# Patient Record
Sex: MALE | Race: WHITE | Employment: OTHER | URBAN - METROPOLITAN AREA
[De-identification: names, ages, dates, MRNs, and addresses within clinical notes are randomized per-mention and may not be internally consistent; named-entity substitution may affect disease eponyms.]

---

## 2018-03-27 ENCOUNTER — HOSPITAL ENCOUNTER (OUTPATIENT)
Dept: MRI IMAGING | Age: 73
Discharge: HOME | End: 2018-03-27
Attending: ORTHOPAEDIC SURGERY
Payer: MEDICARE

## 2018-03-27 DIAGNOSIS — M48.062 SPINAL STENOSIS, LUMBAR REGION WITH NEUROGENIC CLAUDICATION: ICD-10-CM

## 2018-03-27 PROCEDURE — 72148 MRI LUMBAR SPINE W/O DYE: CPT

## 2018-07-02 ENCOUNTER — HOSPITAL ENCOUNTER (OUTPATIENT)
Dept: DIABETES SERVICES | Age: 73
Discharge: HOME | End: 2018-07-02
Payer: MEDICARE

## 2018-07-02 DIAGNOSIS — E11.9 TYPE 2 DIABETES MELLITUS WITHOUT COMPLICATION, UNSPECIFIED WHETHER LONG TERM INSULIN USE (HCC): ICD-10-CM

## 2018-07-02 PROCEDURE — G0109 DIAB MANAGE TRN IND/GROUP: HCPCS | Performed by: DIETITIAN, REGISTERED

## 2018-10-05 ENCOUNTER — HOSPITAL ENCOUNTER (OUTPATIENT)
Dept: VASCULAR SURGERY | Age: 73
Discharge: HOME | End: 2018-10-05
Attending: NEUROLOGICAL SURGERY
Payer: MEDICARE

## 2018-10-05 DIAGNOSIS — R09.89: ICD-10-CM

## 2018-10-05 PROCEDURE — 93923 UPR/LXTR ART STDY 3+ LVLS: CPT

## 2018-10-05 NOTE — PROCEDURES
[facility]  *** FINAL REPORT ***    Name: Rob Oconnor  MRN: [id number]    Outpatient  : 01 Sep 1945  HIS Order #: [id number]  [id number] [facility] Visit #: [id number]  Date: 05 Oct 2018    TYPE OF TEST: Peripheral Arterial Testing    REASON FOR TEST  Pulseless    Right Leg  Segmentals: Abnormal                     mmHg  Brachial         128  High thigh       140  Low thigh        136  Calf             128  Posterior tibial  87  Dorsalis pedis    90  Peroneal  Metatarsal  Toe pressure      67  Doppler:  PVR:        Normal  Ankle/Brachial: 0.69    Left Leg  Segmentals: Abnormal                     mmHg  Brachial         131  High thigh       106  Low thigh         92  Calf              87  Posterior tibial  94  Dorsalis pedis    79  Peroneal  Metatarsal  Toe pressure      56  Doppler:  PVR:        Abnormal  Ankle/Brachial: 0.72    INTERPRETATION/FINDINGS  PROCEDURE:  Evaluation of lower extremity arteries with multilevel  systolic blood pressure measurements and pulse volume recording (PVR)  plethysmography. Includes calculation of the ankle/brachial pressure  indices (CORTES's). IMPRESSION:  1. Moderate peripheral arterial disease indicated at rest in the right   leg at multiple levels. 2. Moderate peripheral arterial disease indicated at rest in the left  leg at multiple levels. 3. The right ankle/brachial index is 0.69 and the left ankle/brachial  index is 0.72.  4. The right toe/brachial index is 0.51 and the left toe/brachial  index is 0.43, both indicatiing vascular cisease in the digits. ADDITIONAL COMMENTS    I have personally reviewed the data relevant to the interpretation of  this  study. TECHNOLOGIST: Lalitha Marcum RVT  Signed: 10/05/2018 09:53 AM    PHYSICIAN: Adilene Huff MD  Signed: 10/08/2018 10:26 AM

## 2019-10-17 ENCOUNTER — HOSPITAL ENCOUNTER (OUTPATIENT)
Dept: ULTRASOUND IMAGING | Age: 74
Discharge: HOME | End: 2019-10-17
Attending: INTERNAL MEDICINE
Payer: MEDICARE

## 2019-10-17 DIAGNOSIS — N18.30 CHRONIC KIDNEY DISEASE, STAGE III (MODERATE) (HCC): ICD-10-CM

## 2019-10-17 PROCEDURE — 76770 US EXAM ABDO BACK WALL COMP: CPT

## 2021-12-29 ENCOUNTER — HOSPITAL ENCOUNTER (OUTPATIENT)
Age: 76
Setting detail: OUTPATIENT SURGERY
Discharge: HOME | End: 2021-12-29
Attending: SPECIALIST | Admitting: SPECIALIST
Payer: MEDICARE

## 2021-12-29 VITALS
OXYGEN SATURATION: 100 % | TEMPERATURE: 97.6 F | BODY MASS INDEX: 29.11 KG/M2 | SYSTOLIC BLOOD PRESSURE: 104 MMHG | HEART RATE: 86 BPM | DIASTOLIC BLOOD PRESSURE: 84 MMHG | HEIGHT: 70 IN | RESPIRATION RATE: 24 BRPM | WEIGHT: 203.3 LBS

## 2021-12-29 DIAGNOSIS — I35.2 AORTIC HEART VALVE NARROWING WITH INSUFFICIENCY: ICD-10-CM

## 2021-12-29 LAB
ACT BLD: 172 SECS (ref 79–138)
ANION GAP SERPL CALC-SCNC: 8 MMOL/L (ref 5–15)
ATRIAL RATE: 78 BPM
BUN SERPL-MCNC: 23 MG/DL (ref 6–20)
BUN/CREAT SERPL: 18 (ref 12–20)
CALCIUM SERPL-MCNC: 9.2 MG/DL (ref 8.5–10.1)
CALCULATED P AXIS, ECG09: 60 DEGREES
CALCULATED R AXIS, ECG10: 4 DEGREES
CALCULATED T AXIS, ECG11: 45 DEGREES
CHLORIDE SERPL-SCNC: 109 MMOL/L (ref 97–108)
CO2 SERPL-SCNC: 24 MMOL/L (ref 21–32)
CREAT SERPL-MCNC: 1.31 MG/DL (ref 0.7–1.3)
DIAGNOSIS, 93000: NORMAL
ERYTHROCYTE [DISTWIDTH] IN BLOOD BY AUTOMATED COUNT: 14.6 % (ref 11.5–14.5)
GLUCOSE SERPL-MCNC: 120 MG/DL (ref 65–100)
HCT VFR BLD AUTO: 50.1 % (ref 36.6–50.3)
HGB BLD-MCNC: 16.2 G/DL (ref 12.1–17)
MAGNESIUM SERPL-MCNC: 2.6 MG/DL (ref 1.6–2.4)
MCH RBC QN AUTO: 28.7 PG (ref 26–34)
MCHC RBC AUTO-ENTMCNC: 32.3 G/DL (ref 30–36.5)
MCV RBC AUTO: 88.7 FL (ref 80–99)
NRBC # BLD: 0 K/UL (ref 0–0.01)
NRBC BLD-RTO: 0 PER 100 WBC
P-R INTERVAL, ECG05: 140 MS
PLATELET # BLD AUTO: 209 K/UL (ref 150–400)
PMV BLD AUTO: 10.4 FL (ref 8.9–12.9)
POTASSIUM SERPL-SCNC: 4.2 MMOL/L (ref 3.5–5.1)
Q-T INTERVAL, ECG07: 402 MS
QRS DURATION, ECG06: 98 MS
QTC CALCULATION (BEZET), ECG08: 458 MS
RBC # BLD AUTO: 5.65 M/UL (ref 4.1–5.7)
SODIUM SERPL-SCNC: 141 MMOL/L (ref 136–145)
VENTRICULAR RATE, ECG03: 78 BPM
WBC # BLD AUTO: 7.9 K/UL (ref 4.1–11.1)

## 2021-12-29 PROCEDURE — 99152 MOD SED SAME PHYS/QHP 5/>YRS: CPT | Performed by: SPECIALIST

## 2021-12-29 PROCEDURE — C1894 INTRO/SHEATH, NON-LASER: HCPCS | Performed by: SPECIALIST

## 2021-12-29 PROCEDURE — 77030004532 HC CATH ANGI DX IMP BSC -A: Performed by: SPECIALIST

## 2021-12-29 PROCEDURE — 93571 IV DOP VEL&/PRESS C FLO 1ST: CPT | Performed by: SPECIALIST

## 2021-12-29 PROCEDURE — 74011250636 HC RX REV CODE- 250/636: Performed by: SPECIALIST

## 2021-12-29 PROCEDURE — 77030003390 HC NDL ANGI MRTM -A: Performed by: SPECIALIST

## 2021-12-29 PROCEDURE — C1887 CATHETER, GUIDING: HCPCS | Performed by: SPECIALIST

## 2021-12-29 PROCEDURE — 77030008543 HC TBNG MON PRSS MRTM -A: Performed by: SPECIALIST

## 2021-12-29 PROCEDURE — 77030013797 HC KT TRNSDUC PRSSR EDWD -A: Performed by: SPECIALIST

## 2021-12-29 PROCEDURE — 93005 ELECTROCARDIOGRAM TRACING: CPT

## 2021-12-29 PROCEDURE — 99153 MOD SED SAME PHYS/QHP EA: CPT | Performed by: SPECIALIST

## 2021-12-29 PROCEDURE — 2709999900 HC NON-CHARGEABLE SUPPLY: Performed by: SPECIALIST

## 2021-12-29 PROCEDURE — 74011250637 HC RX REV CODE- 250/637: Performed by: SPECIALIST

## 2021-12-29 PROCEDURE — 83735 ASSAY OF MAGNESIUM: CPT

## 2021-12-29 PROCEDURE — 93454 CORONARY ARTERY ANGIO S&I: CPT | Performed by: SPECIALIST

## 2021-12-29 PROCEDURE — 85027 COMPLETE CBC AUTOMATED: CPT

## 2021-12-29 PROCEDURE — C1769 GUIDE WIRE: HCPCS | Performed by: SPECIALIST

## 2021-12-29 PROCEDURE — 92928 PRQ TCAT PLMT NTRAC ST 1 LES: CPT | Performed by: SPECIALIST

## 2021-12-29 PROCEDURE — 77030029065 HC DRSG HEMO QCLOT ZMED -B

## 2021-12-29 PROCEDURE — 36415 COLL VENOUS BLD VENIPUNCTURE: CPT

## 2021-12-29 PROCEDURE — 85347 COAGULATION TIME ACTIVATED: CPT

## 2021-12-29 PROCEDURE — 74011000250 HC RX REV CODE- 250: Performed by: SPECIALIST

## 2021-12-29 PROCEDURE — 74011000636 HC RX REV CODE- 636: Performed by: SPECIALIST

## 2021-12-29 PROCEDURE — 80048 BASIC METABOLIC PNL TOTAL CA: CPT

## 2021-12-29 PROCEDURE — C1874 STENT, COATED/COV W/DEL SYS: HCPCS | Performed by: SPECIALIST

## 2021-12-29 PROCEDURE — 77030013715 HC INFL SYS MRTM -B: Performed by: SPECIALIST

## 2021-12-29 DEVICE — XIENCE SIERRA™ EVEROLIMUS ELUTING CORONARY STENT SYSTEM 3.00 MM X 18 MM / RAPID-EXCHANGE
Type: IMPLANTABLE DEVICE | Status: FUNCTIONAL
Brand: XIENCE SIERRA™

## 2021-12-29 RX ORDER — CLOPIDOGREL BISULFATE 75 MG/1
75 TABLET ORAL DAILY
Qty: 30 TABLET | Refills: 5 | Status: SHIPPED | OUTPATIENT
Start: 2021-12-29

## 2021-12-29 RX ORDER — SODIUM CHLORIDE 0.9 % (FLUSH) 0.9 %
5-40 SYRINGE (ML) INJECTION AS NEEDED
Status: DISCONTINUED | OUTPATIENT
Start: 2021-12-29 | End: 2021-12-29 | Stop reason: HOSPADM

## 2021-12-29 RX ORDER — SODIUM CHLORIDE 0.9 % (FLUSH) 0.9 %
5-40 SYRINGE (ML) INJECTION EVERY 8 HOURS
Status: DISCONTINUED | OUTPATIENT
Start: 2021-12-29 | End: 2021-12-29 | Stop reason: HOSPADM

## 2021-12-29 RX ORDER — LIDOCAINE HYDROCHLORIDE 10 MG/ML
INJECTION INFILTRATION; PERINEURAL AS NEEDED
Status: DISCONTINUED | OUTPATIENT
Start: 2021-12-29 | End: 2021-12-29 | Stop reason: HOSPADM

## 2021-12-29 RX ORDER — SODIUM CHLORIDE 9 MG/ML
75 INJECTION, SOLUTION INTRAVENOUS ONCE
Status: DISCONTINUED | OUTPATIENT
Start: 2021-12-29 | End: 2021-12-29 | Stop reason: HOSPADM

## 2021-12-29 RX ORDER — LISINOPRIL 40 MG/1
40 TABLET ORAL DAILY
COMMUNITY

## 2021-12-29 RX ORDER — NITROGLYCERIN 0.4 MG/1
0.4 TABLET SUBLINGUAL
Status: DISCONTINUED | OUTPATIENT
Start: 2021-12-29 | End: 2021-12-29 | Stop reason: HOSPADM

## 2021-12-29 RX ORDER — ASPIRIN 325 MG
325 TABLET ORAL DAILY
Status: DISCONTINUED | OUTPATIENT
Start: 2021-12-29 | End: 2021-12-29 | Stop reason: HOSPADM

## 2021-12-29 RX ORDER — FENTANYL CITRATE 50 UG/ML
INJECTION, SOLUTION INTRAMUSCULAR; INTRAVENOUS AS NEEDED
Status: DISCONTINUED | OUTPATIENT
Start: 2021-12-29 | End: 2021-12-29 | Stop reason: HOSPADM

## 2021-12-29 RX ORDER — CLOPIDOGREL 300 MG/1
TABLET, FILM COATED ORAL AS NEEDED
Status: DISCONTINUED | OUTPATIENT
Start: 2021-12-29 | End: 2021-12-29 | Stop reason: HOSPADM

## 2021-12-29 RX ORDER — ASPIRIN 81 MG/1
TABLET ORAL DAILY
COMMUNITY

## 2021-12-29 RX ORDER — ROSUVASTATIN CALCIUM 20 MG/1
20 TABLET, COATED ORAL
COMMUNITY

## 2021-12-29 RX ORDER — HEPARIN SODIUM 200 [USP'U]/100ML
INJECTION, SOLUTION INTRAVENOUS
Status: COMPLETED | OUTPATIENT
Start: 2021-12-29 | End: 2021-12-29

## 2021-12-29 RX ORDER — MULTIVIT WITH MINERALS/HERBS
1 TABLET ORAL DAILY
COMMUNITY

## 2021-12-29 RX ORDER — ALLOPURINOL 300 MG/1
TABLET ORAL DAILY
COMMUNITY

## 2021-12-29 RX ORDER — HEPARIN SODIUM 1000 [USP'U]/ML
INJECTION, SOLUTION INTRAVENOUS; SUBCUTANEOUS AS NEEDED
Status: DISCONTINUED | OUTPATIENT
Start: 2021-12-29 | End: 2021-12-29 | Stop reason: HOSPADM

## 2021-12-29 RX ORDER — AMLODIPINE BESYLATE 10 MG/1
TABLET ORAL DAILY
COMMUNITY

## 2021-12-29 RX ORDER — MIDAZOLAM HYDROCHLORIDE 1 MG/ML
INJECTION, SOLUTION INTRAMUSCULAR; INTRAVENOUS AS NEEDED
Status: DISCONTINUED | OUTPATIENT
Start: 2021-12-29 | End: 2021-12-29 | Stop reason: HOSPADM

## 2021-12-29 RX ORDER — HYDROCORTISONE SODIUM SUCCINATE 100 MG/2ML
100 INJECTION, POWDER, FOR SOLUTION INTRAMUSCULAR; INTRAVENOUS
Status: DISCONTINUED | OUTPATIENT
Start: 2021-12-29 | End: 2021-12-29 | Stop reason: HOSPADM

## 2021-12-29 NOTE — DISCHARGE INSTRUCTIONS
Discharge Instructions:   Cardiac Catheterization/Angiography Discharge Instructions    *Check the puncture site frequently for swelling or bleeding. If you see any bleeding, lie down and apply pressure over the area and call 911. Notify your doctor for any redness, swelling, drainage or oozing from the puncture site. Notify your doctor for any fever or chills. *If the leg with the puncture becomes cold, numb or painful, call your cardiologist.    *Activity should be limited for the next 48 hours. Climb stairs as little as possible and avoid any stooping, bending or strenuous activity for 48 hours. No heavy lifting (anything over 10 pounds) for 5 days. *Do not drive for 24 hours. *You may resume your usual diet. Drink more fluids than usual.    *Have a responsible person drive you home and stay with you for at least 24 hours after your heart catheterization/angiography. *You may remove the bandage from your groin in 24 hours. You may shower in 24 hours. No tub baths, hot tubs or swimming for one week. Do not place any lotions, creams, powders, ointments over the puncture site for one week. You may place a clean band-aid over the puncture site each day for 5 days. Change this daily. Medications: Activity:     As tolerated except do not lift over 10 pounds for 5 days. Diet:     American Heart Association. Follow-up:     Follow up with Robert Jorge MD on January 5th, 2022 at 10:20am  [address] [facility] [address]  [phone]      If you smoke, STOP!

## 2021-12-29 NOTE — PROCEDURES
Cath:  Obstructive 2VD:     LAD p80 (iFR = 0.50), m40     LCx LI (left dom); OM1 ost70 (very small vessel)     RCA patent (non-dom) with collat to distal LCx (though no LCx stenosis)  Successful LUCAS pLAD: 3.0x18 Xience  RFA manual    ASA/plavix, statin.   F/U with Dr. Jatinder Zheng 1/5/22 @ 10:20am.

## 2021-12-29 NOTE — CARDIO/PULMONARY
[facility] Cardiopulmonary Rehab:  68 yo male admitted for pre-TAVR cardiac cath. S/P PTCA with LUCAS to LAD (12/29). LVEF 60% by echo (12/22/21). Met with patient prior to discharge from Cath Lab Post-Op unit. His wife was also present. Pt reported he resides with his wife in [place]. Pt aware he needed a stent. Reported he will be having valve surgery, by Dr Hnady Matos. Noted pt also has vascular surgery for PAD, planned with Dr Radha Sahni on 1/9/22. PCI education folder & stent card at bedside. CAD risk factors: HTN, HLD, obesity, age & gender. He has never smoked cigarettes. Reviewed importance of outpatient cardiac rehab following stent & valve surgery. Pt indicated he would probably participate. He has appt with Dr Pa Rosa on 1/5/22. All questions were answered.

## 2021-12-29 NOTE — Clinical Note
TRANSFER - OUT REPORT:     Verbal report given to: Jo-Ann Virgen, (at bedside). Report consisted of patient's Situation, Background, Assessment and   Recommendations(SBAR). Opportunity for questions and clarification was provided. Patient transported with a Registered Nurse. Patient transported to: recovery.

## 2021-12-29 NOTE — ROUTINE PROCESS
8:25 AM  Patient arrived. ID and allergies verified verbally with patient. Pt voices understanding of procedure to be performed. Consent obtained. Pt prepped for procedure. TRANSFER - IN REPORT:    Verbal report received from emmy albarran.(name) on [place]  being received from cath lab(unit) for routine progression of care      Report consisted of patients Situation, Background, Assessment and   Recommendations(SBAR). Information from the following report(s) Procedure Summary was reviewed with the receiving nurse. Opportunity for questions and clarification was provided. Assessment completed upon patients arrival to unit and care assumed. 11:34 AM  Blood aspirated from sheath. 6 Fr sheath pulled from r Groin. Davey Gus applied. Manual pressure held by emmy Aguayo.      11:44 AM  Hemostasis achieved. Dressing applied. Tolerated well. CDI.    1:43 PM  Began to sit up patient in bed slowly. 2:45 PM  Patient ambulates in hallway or on unit. Discharge instructions reviewed with patient and family. Voiced understanding. Patient given copy of discharge instructions to take home. 3:00 PM  Pt discharged via wheelchair with wife. Personal belongings with patient upon discharge.

## 2021-12-29 NOTE — Clinical Note
TRANSFER - IN REPORT:     Verbal report received from: Dominic Elise. Report consisted of patient's Situation, Background, Assessment and   Recommendations(SBAR). Opportunity for questions and clarification was provided. Assessment completed upon patient's arrival to unit and care assumed. Patient transported with a Registered Nurse.

## 2021-12-29 NOTE — H&P
Date of Surgery Update:  Yisel Banks was seen and examined. History and physical has been reviewed. The patient has been examined. There have been no significant clinical changes since the completion of the originally dated History and Physical.    Signed By: Salinas Fuller MD     December 29, 2021 9:29 AM         Pre-TAVR  Echo (12/22/21):  EF 60%. AV PG 65, MG 41, ANGELICA 0.8        Hans Rendon 1945   Office/Outpatient Visit  Visit Date: Wed, Dec 22, 2021 11:40 am  Provider: Diya Contreras MD (Assistant: Laurent Skinner, RN )  Location: Cardiology of [facility] AT [facility])[address] [place] [address] [phone]    Electronically signed by Ara Gabriel MD on  12/22/2021 12:24:43 PM                           Subjective:    CC: Mr. Rashawn Torrez is a 67year old White male. His primary care physician is Issac Moe MD.  This is a 6  month follow-up visit. He presents today with a complaint of chest pain and shortness of breath. He presents for cardiac evaluation for pre-operative clearance. Mr. Rashawn Torrez is planning to have vascular surgery with Dr Gracy Moser on 1/9/2022. Since his last visit, he has had the following testing: echocardiogram (Echocardiogram). Medication bottles reviewed. The primary reason for today's visit is evaluation of the following: unspecified hyperlipidemia and essential hypertension. HPI:           Hypercholesterolemia: Current treatment includes Lipitor and diet. Regarding hypertension:  MD Notes: PAD with lower extremity stents (was being followed by Dr. Reid Moseley). Patient has been going to the [facility] for numbness in his legs/feet and states he is seeing improvement. Type Primary Hypertension Review of his blood pressure log reveals systolics in the 446N. Currently, his treatment regimen consists of daily 81 mg aspirin, an ACE/ARB ( lisinopril ),  Crestor, and Norvasc. Heart Valves:   Aortic Stenosis: Aortic stenosis is moderate. Associated symptoms include chest pain and dyspnea. He denies palpitations, syncope or ankle swelling. To evaluate for aortic stenosis, the patient has had a prior echocardiogram ( performed 12/22/2021; official interpretation shows Normal LV systolic function with an estimated ejection fraction of 60%. There is moderate left ventricular hypertrophy. The left atrium is moderately enlarged. The right atrium is mildly enlarged. There is mild aortic regurgitation. There is trace mitral regurgitation. There is trace tricuspid regurgitation. Severe aortic stenosis with a calculated aortic valve area of 0.8 cm2 with a peak gradient of 65 mmHg and a mean gradient of 41 mmHg. ). Regarding chest pain:   The discomfort is located primarily in the center of the chest.  Typically, individual episodes of chest pain last only a few seconds. He characterizes the pain as burning. It seems to be worse with exertion. Pain improves with rest.            Regarding dyspnea/shortness of breath: This tends to be worse with exertion. The shortness of breath is better rest.          MD Notes: Patient is scheduled to have right lower extremity surgery on 1/9/2021 with Dr. Swathi Latham Encounter for preprocedural cardiovascular examination noted. Past Medical History / Family History / Social History:     Last Reviewed on 12/22/2021 12:04 PM by Dallas Monteiro  Past Medical History:     Hyperlipidemia  Aortic Stenosis: mod to severe;   COPD   Lumbar disc disease L5, S1 2005   Gout   INFLUENZA VACCINE: declined   COVID-19 VACCINE: was last done 2021 Moderna x2     Past Cardiac Procedures/Tests:  Echocardiogram on 5/29/19: EF 32% grade 1 diastolic dysfunction. Mod to severe aortic stenosis. .  Echocardiogram on 6/15/2020 -  Normal LV systolic function with an estimated ejection fraction of 55%. There is mild left ventricular hypertrophy. There is trace aortic regurgitation.   There is mild mitral regurgitation. There is trace tricuspid regurgitation. There is trace pulmonary regurgitation. Moderate aortic stenosis with a calculated aortic valve area of 1.0 cm2. Echocardiogram on 17: normal EF, normal valves. Results:  Nuclear Study:  Seamus [place] 19 - Normal myocardial perfusion Sestamibi SPECT imaging. Calculated left ventricular ejection fraction was normal at 64%. Carotid Study:  19  Mild plaques involving the carotid system. <50% stenosis of the right internal carotid artery. <50% stenosis of the left internal carotid artery. Antegrade vertebral artery flow on the left side. Echo 21 - Normal LV systolic function with an estimated ejection fraction of 55%. There is mild left ventricular hypertrophy. The left atrium is moderately enlarged. The right atrium is mildly enlarged. There is mild aortic regurgitation. There is trace mitral regurgitation. There is trace tricuspid regurgitation. Severe aortic stenosis with a calculated aortic valve area of 0.7 cm2 with a peak gradient of 65 mmHg and a mean gradient of 39 mmHg. Surgical History:   Surgical/Procedural History:   Spinal injection     Family History:   Father:   ; Positive for Congestive Heart Failure; Mother:   ; Positive for Congestive Heart Failure;   Brother(s):  Positive for CABG;     Social History:   Social History:   Occupation: Retired   Marital Status:    Children: 1 child     Tobacco/Alcohol/Supplements:   Last Reviewed on 2021 12:04 PM by Stanley Tapia  TOBACCO/ALCOHOL/SUPPLEMENTS   Tobacco: He has never smoked. Alcohol: He typically consumes wine. Caffeine:  He admits to consuming caffeine via coffee ( 1 serving per day ).       Substance Abuse History:   Last Reviewed on 2021 12:04 PM by Evonne Diaz  Substance Use/Abuse:   None     Mental Health History:   Last Reviewed on 2021 12:04 PM by Evonnejh Diaz    Communicable Diseases (eg STDs):   Last Reviewed on 12/22/2021 12:04 PM by Yane Huff    Current Problems:   Last Reviewed on 12/22/2021 12:04 PM by Yane Huff  Other hyperlipidemia  Aortic valve disorder  Other hyperlipidemia  Hyperlipidemia, unspecified  Abnormal echocardiogram  HTN  Family history of coronary artery disease  Abnormal ECG  Aortic stenosis  Dizziness and giddiness  Dizziness  Essential (primary) hypertension  Nonrheumatic aortic (valve) insufficiency  Shortness of breath  Malaise and fatigue  Nonrheumatic aortic (valve) stenosis  Fatigue  Encounter for preprocedural cardiovascular examination  Other chest pain  Chest pain, unspecified    Allergies:   Last Reviewed on 12/22/2021 12:04 PM by Rafael Harman  atorvastatin: Muscle pain,Muscle cramp  (Adverse Reaction)    Current Medications:   Last Reviewed on 12/22/2021 12:04 PM by Martha Pradhan  aspirin 81 mg oral tablet, delayed release (enteric coated) [1 po qd]  lisinopriL 40 mg oral tablet [TAKE 1 TABLET BY MOUTH EVERY DAY]  Allopurinol 300 mg oral tablet [1 po qd]  amLODIPine 5 mg oral tablet [TAKE 1 TABLET BY MOUTH EVERY DAY]  rosuvastatin 20 mg oral tablet [TAKE 1 TABLET BY MOUTH EVERY DAY]  Super  B complex  [1 po qd]    Objective:    Vitals:     Historical:   6/16/2021  BP:   164/83 mm Hg ( (left arm, , standing, );) 6/16/2021  BP:   163/77 mm Hg ( (left arm, , sitting, );) 6/16/2021  Wt:   202lbs  Current: 12/22/2021 12:03:06 PM  Ht:  5 ft, 10 in; Wt: 206 lbs;  BMI: 29. 6BP: 156/86 mm Hg (left arm, sitting);  P: 83 bpm (left arm (BP Cuff), sitting);  sCr: 1.41 mg/dL;  GFR: 45.41    Repeat:   12:3:25 PM  BP:   167/89mm Hg (left arm, standing)   Exams:   GENERAL:  Alert, oriented to person, place and time. HEENT:  Pinkish palpebral  conjunctivae. Anicteric sclerae. Neck: Bilateral carotid bruit   CHEST: Equal expansion. Clear breath sounds. No rales, no wheezing. Heart: Reg rate and rhythm.  Grade 3/6 systolic ejection murmur at the aortic area radiating to the neck radiating to the precordium. ABDOMEN:  Soft. Normal active bowel sounds. No tenderness. EXTREMITIES:  No pitting pedal edema. Equal pulses bilaterally. NEURO:  Grossly intact. Procedures: Other hyperlipidemia    ECG INTERPRETATION: See scanned EKG for results. Assessment:     E78.4   Other hyperlipidemia     I35.1   Nonrheumatic aortic (valve) insufficiency     I10   Essential (primary) hypertension     I10   Essential (primary) hypertension     I35.0   Nonrheumatic aortic (valve) stenosis     I35.0   Nonrheumatic aortic (valve) stenosis     R07.9   Chest pain, unspecified     R06.02   Shortness of breath     R07.89   Other chest pain     Z01.810   Encounter for preprocedural cardiovascular examination       ORDERS:     Procedures Ordered:     [id number]  Electrocardiogram, routine with at least 12 leads; with interpretation and report  (In-House)          16619  Education and train for pt self-mgmt by qualified, nonphysician, ea 30 minutes; individual pt  (Send-Out)          XCATH  Cardiac Cath  (In-House)          RFSURG  General Surgeon Referral  (Send-Out)          XCD  Carotid Doppler  (In-House)          Other Orders:     [id number]  Queried Patient for Tobacco Use  (Send-Out)              Plan:     Other hyperlipidemia      Orders:     [id number]  Electrocardiogram, routine with at least 12 leads; with interpretation and report  (In-House)            Patient Education Handouts:     COV Heart Healthy Diet      Nonrheumatic aortic (valve) stenosis  1. Medication list has been reviewed. Change the following medication(s):  Amlodipine 5 mg. take two tablets (10 mg.) once daily. Smoking Status:  Nonsmoker   2. Advised the patient regarding diet, exercise, and lifestyle modification. 3.  The patient to call the office if there is any change in his cardiac symptoms. 4.  Explained to the patient the importance of controlling his cardiac risk factors.     Mr. Gerri Mcmillan has not been cleared for surgery/procedure. Our recommendations include:  further cardiac evaluation     Testing/Procedures:  Carotid Doppler Cardiac Catheterization  Explained to the patient the indication, procedure, risks, and benefits of cardiac catheterization. The patient understands  and wishes to proceed with the cath to be performed as an outpatient at [facility] by Dr. Enoc Mesa. Schedule a follow-up appointment in 1 week. Referrals:  I am referring Mr. Peter to a cardiac surgeon Leif Bay MD.      referral for severe aortic stenosis. I also recommend that you monitor your BP. Target BP less than 130/80. The above note was transcribed by Zarina Bradshaw and authenticated by Dr. Venu Cates prior to sign off.

## 2022-06-20 ENCOUNTER — OFFICE VISIT (OUTPATIENT)
Dept: NEUROLOGY | Age: 77
End: 2022-06-20
Payer: MEDICARE

## 2022-06-20 VITALS
HEART RATE: 81 BPM | OXYGEN SATURATION: 99 % | SYSTOLIC BLOOD PRESSURE: 152 MMHG | DIASTOLIC BLOOD PRESSURE: 80 MMHG | WEIGHT: 195 LBS | BODY MASS INDEX: 27.98 KG/M2 | RESPIRATION RATE: 14 BRPM

## 2022-06-20 DIAGNOSIS — R27.8 SENSORY ATAXIA: ICD-10-CM

## 2022-06-20 DIAGNOSIS — G62.9 NEUROPATHY: Primary | ICD-10-CM

## 2022-06-20 PROCEDURE — 1101F PT FALLS ASSESS-DOCD LE1/YR: CPT | Performed by: SPECIALIST

## 2022-06-20 PROCEDURE — G8417 CALC BMI ABV UP PARAM F/U: HCPCS | Performed by: SPECIALIST

## 2022-06-20 PROCEDURE — G8536 NO DOC ELDER MAL SCRN: HCPCS | Performed by: SPECIALIST

## 2022-06-20 PROCEDURE — G8432 DEP SCR NOT DOC, RNG: HCPCS | Performed by: SPECIALIST

## 2022-06-20 PROCEDURE — G8427 DOCREV CUR MEDS BY ELIG CLIN: HCPCS | Performed by: SPECIALIST

## 2022-06-20 PROCEDURE — 99204 OFFICE O/P NEW MOD 45 MIN: CPT | Performed by: SPECIALIST

## 2022-06-20 PROCEDURE — 1123F ACP DISCUSS/DSCN MKR DOCD: CPT | Performed by: SPECIALIST

## 2022-06-20 NOTE — PROGRESS NOTES
Neurology Consult      Subjective:      Bertin Barclay is a 67 y.o. male who comes in today with following history. Tells me he has had previous peripheral vascular stents in the legs. Also status post bilateral total knee replacements. Following his TAVR with the symptoms questions went to possible strokelike presentation but such was not the case with follow-up brain MRI. 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 Has background hypertension diabetes mellitus type 2 peripheral vascular disease hyperlipidemia COPD and previous TAVR as noted. And his current explanation of things he says basically it starts when he stands up. It takes a few moments of feeling confident about his center of gravity and moving on. On the visual front says that his vision is okay and says there is some slight discrepancy between focus with his left eye being stronger than his right. Has had previous retinal repair and has what sounds like a subtle degree of disconjugate gaze between the eyes. Otherwise on the checklist it sounds like his major joints are okay as it goes to hip and knee and ankle etc.  His cognition is good so there is no second-guessing his sister mindset and no history I infer to represent orthostatic intolerance. No vocabulary that suggest vertigo and primary vestibular dysfunction. Limb movements are good and do not see any cerebellar components. And on this particular visit no evidence of rate limiting weakness or suggestions of spinal stenosis etc.         Current Outpatient Medications   Medication Sig Dispense Refill    aspirin delayed-release 81 mg tablet Take  by mouth daily.  lisinopriL (PRINIVIL, ZESTRIL) 40 mg tablet Take 40 mg by mouth daily.  allopurinoL (ZYLOPRIM) 300 mg tablet Take  by mouth daily. Take half qd      amLODIPine (NORVASC) 10 mg tablet Take  by mouth daily.       rosuvastatin (Crestor) 20 mg tablet Take 20 mg by mouth nightly.  b complex vitamins tablet Take 1 Tablet by mouth daily.  clopidogreL (Plavix) 75 mg tab Take 1 Tablet by mouth daily. Indications: blood clot prevention following percutaneous coronary intervention 30 Tablet 5      Allergies   Allergen Reactions    Atorvastatin Myalgia     No past medical history on file. No past surgical history on file. Social History     Socioeconomic History    Marital status:      Spouse name: Not on file    Number of children: Not on file    Years of education: Not on file    Highest education level: Not on file   Occupational History    Not on file   Tobacco Use    Smoking status: Not on file    Smokeless tobacco: Not on file   Substance and Sexual Activity    Alcohol use: Not on file    Drug use: Not on file    Sexual activity: Not on file   Other Topics Concern    Not on file   Social History Narrative    Not on file     Social Determinants of Health     Financial Resource Strain:     Difficulty of Paying Living Expenses: Not on file   Food Insecurity:     Worried About Running Out of Food in the Last Year: Not on file    Wilfrido of Food in the Last Year: Not on file   Transportation Needs:     Lack of Transportation (Medical): Not on file    Lack of Transportation (Non-Medical):  Not on file   Physical Activity:     Days of Exercise per Week: Not on file    Minutes of Exercise per Session: Not on file   Stress:     Feeling of Stress : Not on file   Social Connections:     Frequency of Communication with Friends and Family: Not on file    Frequency of Social Gatherings with Friends and Family: Not on file    Attends Protestant Services: Not on file    Active Member of Clubs or Organizations: Not on file    Attends Club or Organization Meetings: Not on file    Marital Status: Not on file   Intimate Partner Violence:     Fear of Current or Ex-Partner: Not on file    Emotionally Abused: Not on file    Physically Abused: Not on file    Sexually Abused: Not on file   Housing Stability:     Unable to Pay for Housing in the Last Year: Not on file    Number of Places Lived in the Last Year: Not on file    Unstable Housing in the Last Year: Not on file      No family history on file. Visit Vitals  BP (!) 152/80 (BP 1 Location: Left arm, BP Patient Position: Sitting, BP Cuff Size: Adult)   Pulse 81   Resp 14   Wt 88.5 kg (195 lb)   SpO2 99%   BMI 27.98 kg/m²        Review of Systems:   A comprehensive review of systems was negative except for that written in the HPI. Neuro Exam:     Appearance: The patient is well developed, well nourished, provides a coherent history and is in no acute distress. Mental Status: Oriented to time, place and person. Mood and affect appropriate. Cranial Nerves:   Intact visual fields. Fundi are benign. MONIQUE, EOM's full, no nystagmus, no ptosis. Facial sensation is normal. Corneal reflexes are intact. Facial movement is symmetric. Hearing is normal bilaterally. Palate is midline with normal sternocleidomastoid and trapezius muscles are normal. Tongue is midline. Motor:  5/5 strength in upper and lower proximal and distal muscles. Normal bulk and tone. No fasciculations. Reflexes:   Deep tendon reflexes 0 to +1 /4 and symmetrical.   Sensory:    Diminished distally approaching moderate normal to touch, pinprick and vibration. Position sense intact. Gait:   Patient standing from sitting took a little bit of adjustment as he got his balance and visual orientation etc.  He had a very deliberate step to step approach and on turns again took his time and stayed safe. Tremor:   No tremor noted. Cerebellar:  No cerebellar signs present. Neurovascular: Toes downgoing no clonus no Davide's. Peripheral pulses diminished and could feel both dorsal pedal pulses and left posterior tibial but not on the right. Has gravity dependent color changes in both legs. Romberg revealed some truncal sway. Assessment:   Neuropathy with sensory ataxia. Offered formal referral to physical therapy, perhaps even with a vestibular type of approach, to basic and instrumental stay safe and self-help measures with directions to avoid falls. Will think about this and let me know. Plan:   As above see as needed.   Signed by :  Coby Kwan MD

## 2022-06-20 NOTE — PATIENT INSTRUCTIONS
Patient with chief complaint of drifting on his feet and unsteadiness etc.  The history and exam support a strong argument for neuropathy and sensory ataxia. Best suggestions are to keep vascular risk factors under best control and management of diabetes. Also offered formal referral to physical therapy for evaluation and safety education to avoid falls etc.  Will think about this and let me know. Otherwise good luck.

## 2022-06-20 NOTE — LETTER
6/20/2022    Patient: Stephanie Figueroa   YOB: 1945   Date of Visit: 6/20/2022     [address]  [facility] [address] [address]  Via Fax: [phone]    Dear Tino Smith DO,      Thank you for referring Mr. Jean Alfonso to [facility] for evaluation. My notes for this consultation are attached. If you have questions, please do not hesitate to call me. I look forward to following your patient along with you.       Sincerely,    Lawson Arreola MD

## 2022-06-20 NOTE — PROGRESS NOTES
Pt seen by [facility], MRI done thinks they found evidence of a mini stroke  MRI done at [facility] around april  Pt does have some imbalance but no other symptoms

## 2023-01-12 ENCOUNTER — HOSPITAL ENCOUNTER (INPATIENT)
Age: 78
LOS: 1 days | Discharge: HOME | End: 2023-01-13
Attending: STUDENT IN AN ORGANIZED HEALTH CARE EDUCATION/TRAINING PROGRAM | Admitting: SPECIALIST
Payer: MEDICARE

## 2023-01-12 ENCOUNTER — APPOINTMENT (OUTPATIENT)
Dept: NON INVASIVE DIAGNOSTICS | Age: 78
End: 2023-01-12
Attending: SPECIALIST
Payer: MEDICARE

## 2023-01-12 DIAGNOSIS — I21.3 ST ELEVATION MYOCARDIAL INFARCTION (STEMI), UNSPECIFIED ARTERY (HCC): Primary | ICD-10-CM

## 2023-01-12 LAB
ANION GAP SERPL CALC-SCNC: 6 MMOL/L (ref 5–15)
BASOPHILS # BLD: 0.1 K/UL (ref 0–0.1)
BASOPHILS NFR BLD: 0 % (ref 0–1)
BUN SERPL-MCNC: 21 MG/DL (ref 6–20)
BUN/CREAT SERPL: 21 (ref 12–20)
CALCIUM SERPL-MCNC: 8.5 MG/DL (ref 8.5–10.1)
CHLORIDE SERPL-SCNC: 106 MMOL/L (ref 97–108)
CO2 SERPL-SCNC: 25 MMOL/L (ref 21–32)
CREAT SERPL-MCNC: 1.02 MG/DL (ref 0.7–1.3)
DIFFERENTIAL METHOD BLD: ABNORMAL
EOSINOPHIL # BLD: 0.1 K/UL (ref 0–0.4)
EOSINOPHIL NFR BLD: 1 % (ref 0–7)
ERYTHROCYTE [DISTWIDTH] IN BLOOD BY AUTOMATED COUNT: 15.9 % (ref 11.5–14.5)
GLUCOSE SERPL-MCNC: 133 MG/DL (ref 65–100)
HCT VFR BLD AUTO: 41.1 % (ref 36.6–50.3)
HGB BLD-MCNC: 13.3 G/DL (ref 12.1–17)
IMM GRANULOCYTES # BLD AUTO: 0.1 K/UL (ref 0–0.04)
IMM GRANULOCYTES NFR BLD AUTO: 0 % (ref 0–0.5)
LYMPHOCYTES # BLD: 1.5 K/UL (ref 0.8–3.5)
LYMPHOCYTES NFR BLD: 9 % (ref 12–49)
MAGNESIUM SERPL-MCNC: 2 MG/DL (ref 1.6–2.4)
MCH RBC QN AUTO: 29 PG (ref 26–34)
MCHC RBC AUTO-ENTMCNC: 32.4 G/DL (ref 30–36.5)
MCV RBC AUTO: 89.5 FL (ref 80–99)
MONOCYTES # BLD: 1.2 K/UL (ref 0–1)
MONOCYTES NFR BLD: 7 % (ref 5–13)
NEUTS SEG # BLD: 14.3 K/UL (ref 1.8–8)
NEUTS SEG NFR BLD: 83 % (ref 32–75)
NRBC # BLD: 0 K/UL (ref 0–0.01)
NRBC BLD-RTO: 0 PER 100 WBC
PLATELET # BLD AUTO: 177 K/UL (ref 150–400)
PMV BLD AUTO: 10.4 FL (ref 8.9–12.9)
POTASSIUM SERPL-SCNC: 3.8 MMOL/L (ref 3.5–5.1)
RBC # BLD AUTO: 4.59 M/UL (ref 4.1–5.7)
SODIUM SERPL-SCNC: 137 MMOL/L (ref 136–145)
TROPONIN-HIGH SENSITIVITY: 6552 NG/L (ref 0–76)
WBC # BLD AUTO: 17.2 K/UL (ref 4.1–11.1)

## 2023-01-12 PROCEDURE — B2111ZZ FLUOROSCOPY OF MULTIPLE CORONARY ARTERIES USING LOW OSMOLAR CONTRAST: ICD-10-PCS | Performed by: SPECIALIST

## 2023-01-12 PROCEDURE — 93454 CORONARY ARTERY ANGIO S&I: CPT | Performed by: SPECIALIST

## 2023-01-12 PROCEDURE — 99285 EMERGENCY DEPT VISIT HI MDM: CPT

## 2023-01-12 PROCEDURE — 77030003390 HC NDL ANGI MRTM -A: Performed by: SPECIALIST

## 2023-01-12 PROCEDURE — 2709999900 HC NON-CHARGEABLE SUPPLY: Performed by: SPECIALIST

## 2023-01-12 PROCEDURE — 96374 THER/PROPH/DIAG INJ IV PUSH: CPT

## 2023-01-12 PROCEDURE — C1887 CATHETER, GUIDING: HCPCS | Performed by: SPECIALIST

## 2023-01-12 PROCEDURE — 99153 MOD SED SAME PHYS/QHP EA: CPT | Performed by: SPECIALIST

## 2023-01-12 PROCEDURE — 80048 BASIC METABOLIC PNL TOTAL CA: CPT

## 2023-01-12 PROCEDURE — 77030013715 HC INFL SYS MRTM -B: Performed by: SPECIALIST

## 2023-01-12 PROCEDURE — 02713ZZ DILATION OF CORONARY ARTERY, TWO ARTERIES, PERCUTANEOUS APPROACH: ICD-10-PCS | Performed by: SPECIALIST

## 2023-01-12 PROCEDURE — B24BZZ4 ULTRASONOGRAPHY OF HEART WITH AORTA, TRANSESOPHAGEAL: ICD-10-PCS | Performed by: SPECIALIST

## 2023-01-12 PROCEDURE — 99152 MOD SED SAME PHYS/QHP 5/>YRS: CPT | Performed by: SPECIALIST

## 2023-01-12 PROCEDURE — 92920 PRQ TRLUML C ANGIOP 1ART&/BR: CPT | Performed by: SPECIALIST

## 2023-01-12 PROCEDURE — 85025 COMPLETE CBC W/AUTO DIFF WBC: CPT

## 2023-01-12 PROCEDURE — 93005 ELECTROCARDIOGRAM TRACING: CPT

## 2023-01-12 PROCEDURE — C1894 INTRO/SHEATH, NON-LASER: HCPCS | Performed by: SPECIALIST

## 2023-01-12 PROCEDURE — G0378 HOSPITAL OBSERVATION PER HR: HCPCS

## 2023-01-12 PROCEDURE — C1725 CATH, TRANSLUMIN NON-LASER: HCPCS | Performed by: SPECIALIST

## 2023-01-12 PROCEDURE — 74011000636 HC RX REV CODE- 636: Performed by: SPECIALIST

## 2023-01-12 PROCEDURE — 36415 COLL VENOUS BLD VENIPUNCTURE: CPT

## 2023-01-12 PROCEDURE — 92975 HC DISSOLVE HRT CLOT W ANGIO: CPT | Performed by: SPECIALIST

## 2023-01-12 PROCEDURE — 83735 ASSAY OF MAGNESIUM: CPT

## 2023-01-12 PROCEDURE — 77030018729 HC ELECTRD DEFIB PAD CARD -B: Performed by: SPECIALIST

## 2023-01-12 PROCEDURE — 65610000006 HC RM INTENSIVE CARE

## 2023-01-12 PROCEDURE — 74011250636 HC RX REV CODE- 250/636: Performed by: SPECIALIST

## 2023-01-12 PROCEDURE — 74011000250 HC RX REV CODE- 250: Performed by: SPECIALIST

## 2023-01-12 PROCEDURE — 77030013797 HC KT TRNSDUC PRSSR EDWD -A: Performed by: SPECIALIST

## 2023-01-12 PROCEDURE — 74011250637 HC RX REV CODE- 250/637: Performed by: SPECIALIST

## 2023-01-12 PROCEDURE — C1769 GUIDE WIRE: HCPCS | Performed by: SPECIALIST

## 2023-01-12 PROCEDURE — 77030002996 HC SUT SLK J&J -A: Performed by: SPECIALIST

## 2023-01-12 PROCEDURE — 84484 ASSAY OF TROPONIN QUANT: CPT

## 2023-01-12 PROCEDURE — 77030008543 HC TBNG MON PRSS MRTM -A: Performed by: SPECIALIST

## 2023-01-12 RX ORDER — DIPHENHYDRAMINE HYDROCHLORIDE 50 MG/ML
25 INJECTION, SOLUTION INTRAMUSCULAR; INTRAVENOUS
Status: ACTIVE | OUTPATIENT
Start: 2023-01-12 | End: 2023-01-13

## 2023-01-12 RX ORDER — ACETAMINOPHEN 325 MG/1
650 TABLET ORAL
Status: DISCONTINUED | OUTPATIENT
Start: 2023-01-12 | End: 2023-01-13 | Stop reason: HOSPADM

## 2023-01-12 RX ORDER — MIDAZOLAM HYDROCHLORIDE 1 MG/ML
INJECTION, SOLUTION INTRAMUSCULAR; INTRAVENOUS AS NEEDED
Status: DISCONTINUED | OUTPATIENT
Start: 2023-01-12 | End: 2023-01-12 | Stop reason: HOSPADM

## 2023-01-12 RX ORDER — MULTIVITAMIN
1 TABLET ORAL DAILY
COMMUNITY
End: 2023-01-13

## 2023-01-12 RX ORDER — HYDROMORPHONE HYDROCHLORIDE 1 MG/ML
1 INJECTION, SOLUTION INTRAMUSCULAR; INTRAVENOUS; SUBCUTANEOUS
Status: DISCONTINUED | OUTPATIENT
Start: 2023-01-12 | End: 2023-01-13 | Stop reason: HOSPADM

## 2023-01-12 RX ORDER — ATENOLOL 25 MG/1
25 TABLET ORAL EVERY 12 HOURS
Status: COMPLETED | OUTPATIENT
Start: 2023-01-12 | End: 2023-01-13

## 2023-01-12 RX ORDER — HEPARIN SODIUM 200 [USP'U]/100ML
INJECTION, SOLUTION INTRAVENOUS
Status: COMPLETED | OUTPATIENT
Start: 2023-01-12 | End: 2023-01-12

## 2023-01-12 RX ORDER — FENTANYL CITRATE 50 UG/ML
INJECTION, SOLUTION INTRAMUSCULAR; INTRAVENOUS AS NEEDED
Status: DISCONTINUED | OUTPATIENT
Start: 2023-01-12 | End: 2023-01-12 | Stop reason: HOSPADM

## 2023-01-12 RX ORDER — SODIUM CHLORIDE 0.9 % (FLUSH) 0.9 %
5-40 SYRINGE (ML) INJECTION AS NEEDED
Status: DISCONTINUED | OUTPATIENT
Start: 2023-01-12 | End: 2023-01-13 | Stop reason: HOSPADM

## 2023-01-12 RX ORDER — EPTIFIBATIDE 0.75 MG/ML
2 INJECTION, SOLUTION INTRAVENOUS CONTINUOUS
Status: DISPENSED | OUTPATIENT
Start: 2023-01-12 | End: 2023-01-13

## 2023-01-12 RX ORDER — MIDAZOLAM HYDROCHLORIDE 1 MG/ML
5 INJECTION, SOLUTION INTRAMUSCULAR; INTRAVENOUS ONCE
Status: COMPLETED | OUTPATIENT
Start: 2023-01-12 | End: 2023-01-12

## 2023-01-12 RX ORDER — NALOXONE HYDROCHLORIDE 0.4 MG/ML
INJECTION, SOLUTION INTRAMUSCULAR; INTRAVENOUS; SUBCUTANEOUS
Status: DISCONTINUED
Start: 2023-01-12 | End: 2023-01-12 | Stop reason: WASHOUT

## 2023-01-12 RX ORDER — HYDROCORTISONE SODIUM SUCCINATE 100 MG/2ML
100 INJECTION, POWDER, FOR SOLUTION INTRAMUSCULAR; INTRAVENOUS
Status: DISPENSED | OUTPATIENT
Start: 2023-01-12 | End: 2023-01-13

## 2023-01-12 RX ORDER — SODIUM CHLORIDE 0.9 % (FLUSH) 0.9 %
5-40 SYRINGE (ML) INJECTION EVERY 8 HOURS
Status: DISCONTINUED | OUTPATIENT
Start: 2023-01-12 | End: 2023-01-13 | Stop reason: HOSPADM

## 2023-01-12 RX ORDER — ASPIRIN 325 MG
325 TABLET, DELAYED RELEASE (ENTERIC COATED) ORAL DAILY
Status: DISCONTINUED | OUTPATIENT
Start: 2023-01-13 | End: 2023-01-13 | Stop reason: HOSPADM

## 2023-01-12 RX ORDER — HEPARIN SODIUM 1000 [USP'U]/ML
INJECTION, SOLUTION INTRAVENOUS; SUBCUTANEOUS AS NEEDED
Status: DISCONTINUED | OUTPATIENT
Start: 2023-01-12 | End: 2023-01-12 | Stop reason: HOSPADM

## 2023-01-12 RX ORDER — SODIUM CHLORIDE 9 MG/ML
75 INJECTION, SOLUTION INTRAVENOUS CONTINUOUS
Status: DISPENSED | OUTPATIENT
Start: 2023-01-12 | End: 2023-01-12

## 2023-01-12 RX ORDER — CLOPIDOGREL BISULFATE 75 MG/1
75 TABLET ORAL DAILY
Status: DISCONTINUED | OUTPATIENT
Start: 2023-01-13 | End: 2023-01-13 | Stop reason: HOSPADM

## 2023-01-12 RX ORDER — LIDOCAINE HYDROCHLORIDE 10 MG/ML
INJECTION INFILTRATION; PERINEURAL AS NEEDED
Status: DISCONTINUED | OUTPATIENT
Start: 2023-01-12 | End: 2023-01-12 | Stop reason: HOSPADM

## 2023-01-12 RX ORDER — FLUMAZENIL 0.1 MG/ML
INJECTION INTRAVENOUS
Status: DISCONTINUED
Start: 2023-01-12 | End: 2023-01-12 | Stop reason: WASHOUT

## 2023-01-12 RX ORDER — EPTIFIBATIDE 0.75 MG/ML
INJECTION, SOLUTION INTRAVENOUS
Status: COMPLETED | OUTPATIENT
Start: 2023-01-12 | End: 2023-01-12

## 2023-01-12 RX ORDER — FENTANYL CITRATE 50 UG/ML
100 INJECTION, SOLUTION INTRAMUSCULAR; INTRAVENOUS ONCE
Status: COMPLETED | OUTPATIENT
Start: 2023-01-12 | End: 2023-01-12

## 2023-01-12 RX ORDER — ROSUVASTATIN CALCIUM 10 MG/1
20 TABLET, COATED ORAL
Status: DISCONTINUED | OUTPATIENT
Start: 2023-01-12 | End: 2023-01-13 | Stop reason: HOSPADM

## 2023-01-12 RX ADMIN — SODIUM CHLORIDE, PRESERVATIVE FREE 10 ML: 5 INJECTION INTRAVENOUS at 13:22

## 2023-01-12 RX ADMIN — EPTIFIBATIDE 2 MCG/KG/MIN: 0.75 INJECTION INTRAVENOUS at 15:17

## 2023-01-12 RX ADMIN — EPTIFIBATIDE 2 MCG/KG/MIN: 0.75 INJECTION INTRAVENOUS at 21:58

## 2023-01-12 RX ADMIN — SODIUM CHLORIDE 75 ML/HR: 9 INJECTION, SOLUTION INTRAVENOUS at 13:22

## 2023-01-12 RX ADMIN — ROSUVASTATIN CALCIUM 20 MG: 10 TABLET, COATED ORAL at 21:57

## 2023-01-12 RX ADMIN — SODIUM CHLORIDE, PRESERVATIVE FREE 10 ML: 5 INJECTION INTRAVENOUS at 23:13

## 2023-01-12 RX ADMIN — MIDAZOLAM 3 MG: 1 INJECTION INTRAMUSCULAR; INTRAVENOUS at 15:00

## 2023-01-12 RX ADMIN — ATENOLOL 25 MG: 25 TABLET ORAL at 21:57

## 2023-01-12 RX ADMIN — FENTANYL CITRATE 50 MCG: 50 INJECTION, SOLUTION INTRAMUSCULAR; INTRAVENOUS at 15:00

## 2023-01-12 NOTE — PROCEDURES
Cath:  Obstructive 1VD:     LAD patent prox stent, m30     LCx patent; OM1 ost70 (small); OM2 sup br 100, inf Br 100; Left      RCA patent, small (non-dom)  RFA manual    POBA attempted on both branches on OM2, minimally successful. OM2 and PDA occluded seemingly due to embolization  Started on integrillin (bolus boluses given intracoronary). Echo to assess TAVR and LVF  CTA to assess for TAVR thrombus  Integrilin x 18h  ASA/plavix. Ulitimately, may stop plavix in lieu of xarelto.

## 2023-01-12 NOTE — Clinical Note
TRANSFER - IN REPORT:     Verbal report received from: ED RN. Report consisted of patient's Situation, Background, Assessment and   Recommendations(SBAR). Opportunity for questions and clarification was provided. Assessment completed upon patient's arrival to unit and care assumed. Patient transported with a Registered Nurse.  ED RN

## 2023-01-12 NOTE — PROCEDURES
MARK:  Mild to moderate LV systolic dysfunction (EF 62-23%) with mild GHK  RA, RV, LA grossly normal.  RA, LA, NOMI without thrombus  Biopros AVR without flow abnormality. No thrombus or mass noted. Adequate leaflet excursion. MV pliable with mild MR.  TV/PV pliable without hemodynamiclly sig flow abn. IAS intact by Doppler.  + Bubble: evidence of small intracardiac shunt by saline contrast.  Small amt of bubbles cross, likely due to small PFO. Trivial atherosclerosis of T-Ao. No convincing evidence of embolic source noted.

## 2023-01-12 NOTE — PROGRESS NOTES
1500 - Took over care from [facility].    9372  - Performed Sheath site check. 1700 - Per Dr. Kacey Gupta, orders to pull sheath with Integrilin running, without checking an ACT. 1712 - Removed sheath, applying pressure with quick-clot for 20 minutes, being assisted by Neema Cardenas RN and [id number] [facility]. Pedal pulse monitored during removal for perfusion. 1732 - Removed pressure, applied tegaderm. Small hematoma formed, less than quarter size. No bleeding or bruising visible. 1745 - Began Q15 minute checks. Site intact, no change to hematoma. 1800 - Site intact, no change to hematoma. 1815 - Site intact, no change to hematoma. 1830 - Site intact, no change to hematoma. Begin Q30 checks now. 1900 - Site intact, no change to hematoma. Bedside and Verbal shift change report given to Luis Angel Hudson RN (oncoming nurse) by Otilio Luu RN (offgoing nurse). Report included the following information SBAR, Kardex, Procedure Summary, Intake/Output, MAR, Recent Results, Cardiac Rhythm NSR, Alarm Parameters , and Dual Neuro Assessment.

## 2023-01-12 NOTE — ED PROVIDER NOTES
Patient is a 74-year-old male with prior history of coronary disease status post coronary stenting and aortic valve insufficiency on aspirin/Plavix here today secondary to chest discomfort. Patient started 1 hour ago with heaviness in his chest, midsternal, nonradiating with associated shortness of breath. It started when he was walking into his garage to get a case of water. He took 325 mg of aspirin. Picked up by EMS who saw STEMI on EKG and he came in as a STEMI alert. Pain was initially an 8 out of 10 but currently 6 out of 10. No nitroglycerin given. Social History     Socioeconomic History    Marital status:      Spouse name: Not on file    Number of children: Not on file    Years of education: Not on file    Highest education level: Not on file   Occupational History    Not on file   Tobacco Use    Smoking status: Not on file    Smokeless tobacco: Not on file   Substance and Sexual Activity    Alcohol use: Not on file    Drug use: Not on file    Sexual activity: Not on file   Other Topics Concern    Not on file   Social History Narrative    Not on file     Social Determinants of Health     Financial Resource Strain: Not on file   Food Insecurity: Not on file   Transportation Needs: Not on file   Physical Activity: Not on file   Stress: Not on file   Social Connections: Not on file   Intimate Partner Violence: Not on file   Housing Stability: Not on file         ALLERGIES: Atorvastatin    Review of Systems   Constitutional:  Negative for chills and fever. HENT:  Negative for congestion and sore throat. Eyes:  Negative for pain and redness. Respiratory:  Positive for shortness of breath. Negative for cough. Cardiovascular:  Positive for chest pain. Negative for palpitations. Gastrointestinal:  Negative for abdominal pain, diarrhea, nausea and vomiting. Genitourinary:  Negative for frequency and hematuria. Musculoskeletal:  Negative for back pain and neck pain.    Skin: Negative for rash and wound. Neurological:  Negative for dizziness and headaches. Hematological:  Does not bruise/bleed easily. There were no vitals filed for this visit. Physical Exam  Vitals and nursing note reviewed. Constitutional:       General: He is not in acute distress. Appearance: He is well-developed. HENT:      Head: Normocephalic and atraumatic. Eyes:      Conjunctiva/sclera: Conjunctivae normal.      Pupils: Pupils are equal, round, and reactive to light. Cardiovascular:      Rate and Rhythm: Normal rate and regular rhythm. Heart sounds: Normal heart sounds. No murmur heard. No friction rub. No gallop. Comments: Equal radial, dp, and pt pulses  Pulmonary:      Effort: Pulmonary effort is normal. No respiratory distress. Breath sounds: Normal breath sounds. No wheezing or rales. Abdominal:      General: Bowel sounds are normal. There is no distension. Palpations: Abdomen is soft. Tenderness: There is no abdominal tenderness. There is no guarding or rebound. Musculoskeletal:         General: Normal range of motion. Cervical back: Normal range of motion and neck supple. Skin:     General: Skin is warm and dry. Capillary Refill: Capillary refill takes less than 2 seconds. Findings: No rash. Neurological:      Mental Status: He is alert and oriented to person, place, and time. Medical Decision Making  Problems Addressed:  ST elevation myocardial infarction (STEMI), unspecified artery [facility]): acute illness or injury that poses a threat to life or bodily functions    Amount and/or Complexity of Data Reviewed  Independent Historian: EMS    Risk  Decision regarding hospitalization.     Critical Care  Total time providing critical care: < 30 minutes         Procedures      EKG upon arrival  Time 10:48 AM  Normal sinus rhythm rate of 81 with ST elevation in inferior leads, normal axis, narrow QRS    Consulted with Dr. Jovi Castle of cardiology, patient to be taken straight to the Cath Lab. He will receive heparin there. Has already received aspirin. MDM:  Patient is a 74-year-old male presenting today as an acute STEMI with findings of inferior MI on EKG. He is in no acute distress at this time, breathing comfortably. IV access obtained in the ED. Will hold on nitroglycerin given this is an inferior STEMI. Has already received aspirin. Can hold heparin until he gets to the Cath Lab per cardiology.        Houston Ovalles, DO

## 2023-01-12 NOTE — ED NOTES
Cardiologist at bedside with Cath Lab staff. Patient on Life Pack. Patient leaving to cath lab after consents signed.  Wife at bedside and informed of current status and situation

## 2023-01-12 NOTE — PROGRESS NOTES
Medicare pt has received, reviewed, and signed 2nd IM letter informing them of their right to appeal the discharge. Signed copy has been placed on pt bedside chart.   Jase Ahuja CMS

## 2023-01-12 NOTE — PROGRESS NOTES
1230 Bedside and Verbal shift change report given to Brandi RN (oncoming nurse) by Cath lab RN (offgoing nurse). Report included the following information SBAR, Med Rec Status, Cardiac Rhythm NSR ST Elevation, and Dual Neuro Assessment. 1230 Assessment  Neuro A&0 x3  Cardiac NSR ST elevation post cath  Respiratory Room air  GI NPO   Urinal  Skin R femoral sheath puncture site  IV R UA #20       1230 Assessment completed. VSS. Pt on integrillin drip at 2mcg for 18H. Plan for Echo and CTA to evaluate for thrombectomy. [address] Dr. Dennys Christina at bedside to perform MARK.   1500 Reported off to [facility].

## 2023-01-12 NOTE — CONSULTS
Dorcas Ingram MD, [address] [place] [address]  [address]  [phone]    Date of  Admission: 1/12/2023 10:51 AM         IMPRESSION and RECOMMENDATIONS      STEMI:  Emergent cath/poss. The risks (including but not limited to death, myocardial infarction, cerebrovascular accident, dysrhythmia, renal failure, vascular complication, allergy, and/or need for emergency surgery), benefits, and alternatives have been explained. Verbal informed consent has been obtained. I have discussed this plan with the patient. He appears to understand this plan and wishes to proceed ahead. Problem List  Date Reviewed: 6/20/2022            Codes Class Noted    STEMI (ST elevation myocardial infarction) [facility]) ICD-10-CM: I21.3  ICD-9-CM: 410.90  1/12/2023           History of Present Illness:     Mick Gregg is a 65 y.o. male with the above problem list who was admitted for STEMI (ST elevation myocardial infarction) ([facility] [address] [I21.3]. Patient is a 23-year-old male with prior history of coronary disease status post coronary stenting and aortic valve insufficiency on aspirin/Plavix here today secondary to chest discomfort. Patient started 1 hour ago with heaviness in his chest, midsternal, nonradiating with associated shortness of breath. It started when he was walking into his garage to get a case of water. He took 325 mg of aspirin. Picked up by EMS who saw STEMI on EKG and he came in as a STEMI alert. Pain was initially an 8 out of 10 but currently 6 out of 10. No nitroglycerin given. He denies chest pain/discomfort, shortness of breath, dyspnea on exertion, orthopnea, paroxysmal noctural dyspnea, lower extremity edema, palpitations, syncope, or near-syncope.     Current Facility-Administered Medications   Medication Dose Route Frequency    lidocaine (XYLOCAINE) 10 mg/mL (1 %) injection    PRN    midazolam (VERSED) injection    PRN fentaNYL citrate (PF) injection    PRN    heparin (porcine) 1,000 unit/mL injection    PRN    heparinized saline 2 units/mL infusion    CONTINUOUS    eptifibatide (INTEGRILIN) 0.75 mg/mL BOLUS    PRN    eptifibatide (INTEGRILIN) 0.75 mg/mL infusion   IntraVENous CONTINUOUS    iopamidoL (ISOVUE-370) 370 mg iodine /mL (76 %) injection    PRN      Allergies   Allergen Reactions    Atorvastatin Myalgia      No family history on file. Social History     Socioeconomic History    Marital status:      Spouse name: Not on file    Number of children: Not on file    Years of education: Not on file    Highest education level: Not on file   Occupational History    Not on file   Tobacco Use    Smoking status: Not on file    Smokeless tobacco: Not on file   Substance and Sexual Activity    Alcohol use: Not on file    Drug use: Not on file    Sexual activity: Not on file   Other Topics Concern    Not on file   Social History Narrative    Not on file     Social Determinants of Health     Financial Resource Strain: Not on file   Food Insecurity: Not on file   Transportation Needs: Not on file   Physical Activity: Not on file   Stress: Not on file   Social Connections: Not on file   Intimate Partner Violence: Not on file   Housing Stability: Not on file       Physical Exam:     Patient Vitals for the past 16 hrs:   BP Temp Pulse Resp SpO2 Height Weight   01/12/23 1056 133/72 97.5 °F (36.4 °C) 83 18 98 % 5' 10\" (1.778 m) 87.5 kg (193 lb)       HEENT Exam:     Normocephalic, atraumatic. EOMI. Oropharynx negative. Neck supple. No lymphadenopathy. Lung Exam:     The patient is not dyspneic. There is no cough. Breath sounds are heard equally in all lung fields. There are no wheezes, rales, rhonchi, or rubs heard on auscultation. Heart Exam:     The rhythm is regular. The PMI is in the 5th intercostal space of the MCL. Apical impulse is normal. S1 is regular. S2 is physiologic.  There is no S3, S4 gallop, murmur, click, or rub. Abdomen Exam:     Bowel sounds are normoactive. Abdomen soft in all quadrants. No tenderness. No palpable masses. No organomegaly. No hernias noted. No bruits or pulsatile mass. Extremities Exam:     The extremities are atraumatic appearing. There is no clubbing, cyanosis, edema, ulcers, varicose veins, rash, erythemia noted in the extremities. The neurovascular status is grossly intact with normal distal sensation and pulses. Vascular Exam:     The radial, brachial, dorsalis pedis, posterior tibial, are equal and strong bilaterally The carotids are equal bilaterally without bruits. Labs:     Lab Results   Component Value Date/Time    Glucose 120 (H) 12/29/2021 08:57 AM    Sodium 141 12/29/2021 08:57 AM    Potassium 4.2 12/29/2021 08:57 AM    Chloride 109 (H) 12/29/2021 08:57 AM    CO2 24 12/29/2021 08:57 AM    BUN 23 (H) 12/29/2021 08:57 AM    Creatinine 1.31 (H) 12/29/2021 08:57 AM    Calcium 9.2 12/29/2021 08:57 AM     No results for input(s): WBC, HGB, HCT, PLT, HGBEXT, HCTEXT, PLTEXT in the last 72 hours. No results for input(s): ALT, AP, TBIL, TBILI, TP, ALB, GLOB, GGT in the last 72 hours. No lab exists for component: SGOT, GPT  No results for input(s): INR, PTP, APTT, INREXT in the last 72 hours. No results for input(s): CPK, CKMB, TNIPOC in the last 72 hours. No lab exists for component: TROPONINI, ITNL  No results for input(s): TROIQ in the last 72 hours. No results found for: CHOL, CHOLX, CHLST, CHOLV, HDL, HDLP, LDL, LDLC, DLDLP, TGLX, TRIGL, TRIGP, CHHD, CHHDX    EKG:   Inf STEMI

## 2023-01-12 NOTE — PROGRESS NOTES
[address] MARIE with dr Ronnie Kwan  1500 patient received a total of 3 mg of versed and 50 mcg of fentanyl for marie, patient placed on 4L NC  to maintain o2. Hemodynamics watched throughout marie. Hemodynamically stable. Able to follow commands. Return of cough and gag at 1515. Time out performed prior to starting. David score pre and post performed.  Bubble study performed during MARIE

## 2023-01-12 NOTE — CARDIO/PULMONARY
[facility] Cardiopulmonary Rehab    OPCR chart review    66 yo from [place], [place] presented to to ED via rescue squad with STEMI. PMH includes CAD with h/o stenting and TAVR. Cardiac cath revealed patent prox LAD stent, 70% ostial OM1, occluded inferior and superior OM2 and left PDA. Nondominant RCA patent. POBA attempted on OM2 branches - minimally successful per Dr. Shannan Vicente' note. Pt started on Integrilin due to ? embolization as cause of occlusion. MARK also performed to evaluate TAVR. EF 50%, no obvious WMA. No thrombus or mass noted. Evidence of small intracardiac shunt with small amount of bubbles crossing - no convincing evidence of embolic source per Shannan Vicente' note. MI/PCI patient meets criteria for OPCR. Educational handouts provided on MI/PCI procedure, CAD, CAD risk factors, heart healthy eating and community resources. Reference information on [facility] program also provided. Spoke with pt at bedside in ICU. Briefly discussed STEMI and procedures done. Pt indicated potential interest in OPCR. Questions answered. Advised pt that we will contact him post discharge to see if he is interested in [email or web address] participation.

## 2023-01-12 NOTE — Clinical Note
TRANSFER - OUT REPORT:     Verbal report given to: Virgie. Report consisted of patient's Situation, Background, Assessment and   Recommendations(SBAR). Opportunity for questions and clarification was provided. Patient transported with a Registered Nurse. Patient transported to: ICU, 3011.

## 2023-01-12 NOTE — Clinical Note
Right femoral artery. Accessed successfully. Femoral access needle used. Using fluoro guidance. Number of attempts =  2.

## 2023-01-13 ENCOUNTER — APPOINTMENT (OUTPATIENT)
Dept: CT IMAGING | Age: 78
End: 2023-01-13
Attending: SPECIALIST
Payer: MEDICARE

## 2023-01-13 VITALS
TEMPERATURE: 98.1 F | WEIGHT: 193 LBS | SYSTOLIC BLOOD PRESSURE: 126 MMHG | BODY MASS INDEX: 27.63 KG/M2 | RESPIRATION RATE: 20 BRPM | HEART RATE: 72 BPM | OXYGEN SATURATION: 94 % | HEIGHT: 70 IN | DIASTOLIC BLOOD PRESSURE: 57 MMHG

## 2023-01-13 LAB
ATRIAL RATE: 81 BPM
ATRIAL RATE: 85 BPM
CALCULATED P AXIS, ECG09: 27 DEGREES
CALCULATED P AXIS, ECG09: 70 DEGREES
CALCULATED R AXIS, ECG10: 19 DEGREES
CALCULATED R AXIS, ECG10: 31 DEGREES
CALCULATED T AXIS, ECG11: 71 DEGREES
CALCULATED T AXIS, ECG11: 89 DEGREES
DIAGNOSIS, 93000: NORMAL
DIAGNOSIS, 93000: NORMAL
ECHO AV MEAN GRADIENT: 13 MMHG
ECHO AV MEAN VELOCITY: 1.7 M/S
ECHO AV PEAK GRADIENT: 31 MMHG
ECHO AV PEAK VELOCITY: 2.7 M/S
ECHO AV VELOCITY RATIO: 0.26
ECHO AV VTI: 47.3 CM
ECHO LV EF PHYSICIAN: 40 %
ECHO LV FRACTIONAL SHORTENING: 12 % (ref 28–44)
ECHO LV INTERNAL DIMENSION DIASTOLE INDEX: 2.04 CM/M2
ECHO LV INTERNAL DIMENSION DIASTOLIC: 4.2 CM (ref 4.2–5.9)
ECHO LV INTERNAL DIMENSION SYSTOLIC INDEX: 1.8 CM/M2
ECHO LV INTERNAL DIMENSION SYSTOLIC: 3.7 CM
ECHO LV IVSD: 1.1 CM (ref 0.6–1)
ECHO LV MASS 2D: 157.1 G (ref 88–224)
ECHO LV MASS INDEX 2D: 76.2 G/M2 (ref 49–115)
ECHO LV POSTERIOR WALL DIASTOLIC: 1.1 CM (ref 0.6–1)
ECHO LV RELATIVE WALL THICKNESS RATIO: 0.52
ECHO LVOT AV VTI INDEX: 0.41
ECHO LVOT MEAN GRADIENT: 1 MMHG
ECHO LVOT PEAK GRADIENT: 2 MMHG
ECHO LVOT PEAK VELOCITY: 0.7 M/S
ECHO LVOT VTI: 19.3 CM
P-R INTERVAL, ECG05: 134 MS
P-R INTERVAL, ECG05: 140 MS
Q-T INTERVAL, ECG07: 388 MS
Q-T INTERVAL, ECG07: 392 MS
QRS DURATION, ECG06: 88 MS
QRS DURATION, ECG06: 94 MS
QTC CALCULATION (BEZET), ECG08: 455 MS
QTC CALCULATION (BEZET), ECG08: 461 MS
VENTRICULAR RATE, ECG03: 81 BPM
VENTRICULAR RATE, ECG03: 85 BPM

## 2023-01-13 PROCEDURE — 75574 CT ANGIO HRT W/3D IMAGE: CPT

## 2023-01-13 PROCEDURE — 77010033678 HC OXYGEN DAILY

## 2023-01-13 PROCEDURE — 74011000636 HC RX REV CODE- 636: Performed by: RADIOLOGY

## 2023-01-13 PROCEDURE — 74011000250 HC RX REV CODE- 250: Performed by: SPECIALIST

## 2023-01-13 PROCEDURE — 74011250637 HC RX REV CODE- 250/637: Performed by: SPECIALIST

## 2023-01-13 PROCEDURE — 75574 CT ANGIO HRT W/3D IMAGE: CPT | Performed by: INTERNAL MEDICINE

## 2023-01-13 PROCEDURE — 74011250636 HC RX REV CODE- 250/636: Performed by: SPECIALIST

## 2023-01-13 RX ADMIN — SODIUM CHLORIDE, PRESERVATIVE FREE 10 ML: 5 INJECTION INTRAVENOUS at 06:00

## 2023-01-13 RX ADMIN — CLOPIDOGREL BISULFATE 75 MG: 75 TABLET ORAL at 08:04

## 2023-01-13 RX ADMIN — ASPIRIN 325 MG: 325 TABLET, COATED ORAL at 08:04

## 2023-01-13 RX ADMIN — EPTIFIBATIDE 2 MCG/KG/MIN: 0.75 INJECTION INTRAVENOUS at 05:51

## 2023-01-13 RX ADMIN — IOPAMIDOL 150 ML: 755 INJECTION, SOLUTION INTRAVENOUS at 10:02

## 2023-01-13 RX ADMIN — ATENOLOL 25 MG: 25 TABLET ORAL at 08:04

## 2023-01-13 NOTE — PROGRESS NOTES
1900- Bedside and Verbal shift change report given to Carla Damon (oncoming nurse) by Sedrick Coleman (offgoing nurse). Report included the following information SBAR, Kardex, Intake/Output, MAR, Recent Results, Cardiac Rhythm nsr, Alarm Parameters , and Quality Measures. See flowsheets for all assessments. See MAR for all medication administrations. 2300- Bedside and Verbal shift change report given to Dorothy (oncoming nurse) by Carla Damon (offgoing nurse). Report included the following information SBAR, Kardex, Intake/Output, MAR, Recent Results, Cardiac Rhythm nsr, Alarm Parameters , and Quality Measures.

## 2023-01-13 NOTE — PROGRESS NOTES
Reason for Admission:  STEMI                     RUR Score:  7%                   Plan for utilizing home health:   no needs identified       PCP: First and Last name:  Edilberto Case, DO     Name of Practice:    Are you a current patient: Yes/No: yes   Approximate date of last visit:    Can you participate in a virtual visit with your PCP:                     Current Advanced Directive/Advance Care Plan: Full Code      Healthcare Decision Maker:                Primary Decision Maker: [id number] [facility] - [phone]                  Transition of Care Plan    Pt was admitted with a STEMI. Pt had an emergent catherization   MARK -See cardiologist's note on MARK results. CM assistant is making a PCP follow-up appointment with JANNA De León. If pt is changed to Sohail Hanht will provide pt with coupon for one free month of xarelto. Wife will transport pt home when discharged. CT coronaries pending. Tad Bolus    Per cardiologist,pt will be on xarelto. Coupon for one month free xarelto given to pt with instructions to take coupon to pharmacy when he fills his script.

## 2023-01-13 NOTE — DISCHARGE INSTRUCTIONS
Discharge Instructions:     Medications: Activity:     As tolerated except do not lift over 10 pounds for 5 days. Diet:     American Heart Association. Follow-up:     Follow up with Jennifer Boston MD on January 20th, 2023 at [address] [place], [place] [address]Jd marte [facility] [[email or web address]][address]  [phone]      If you smoke, STOP!

## 2023-01-13 NOTE — PROGRESS NOTES
2330 Patient resting , no c/o discomforts. 0700 Integrilin drip stopped . Bedside shift change report given to BUTCH Flores .  Report included the following information SBAR, Kardex, ED Summary, Intake/Output, MAR, Accordion, Recent Results, Med Rec Status, and Cardiac Rhythm SR .

## 2023-01-13 NOTE — PROGRESS NOTES
Jennifer Munoz MD, [address] [facility] [address], [place] [address]  [phone]      IMPRESSION and RECOMMENDATIONS      CAD:  Embolic LCx MI. Unable to PCI. MARK showed appropriately functioning AVR. EF ~ 40. Awaiting CTA results. Home on ASA, plavix and xarelto. Has F/U with Dr. Kristal Collins 1/20/23 @ 3pm.                                Subjective:       No complaints. Objective:   Patient Vitals for the past 16 hrs:   BP Temp Pulse Resp SpO2   01/13/23 1200 100/81 98.5 °F (36.9 °C) 70 20 93 %   01/13/23 1100 (!) 107/52 -- 69 16 95 %   01/13/23 1020 (!) 110/51 -- 68 20 --   01/13/23 0900 129/69 -- 63 10 95 %   01/13/23 0800 113/70 98.4 °F (36.9 °C) 68 21 95 %   01/13/23 0700 (!) 117/58 -- 66 20 98 %   01/13/23 0600 111/62 -- 65 19 99 %   01/13/23 0500 106/64 -- 64 14 98 %   01/13/23 0400 107/62 -- 62 15 97 %   01/13/23 0349 109/69 98.3 °F (36.8 °C) 73 11 96 %   01/13/23 0300 (!) 139/111 -- 84 22 96 %   01/13/23 0200 133/63 -- 83 21 97 %   01/13/23 0100 (!) 152/60 -- 82 12 100 %   01/13/23 0000 (!) 144/69 -- 84 21 96 %   01/12/23 2300 (!) 147/63 98.4 °F (36.9 °C) 74 22 96 %   01/12/23 2230 -- -- 73 22 96 %   01/12/23 2200 (!) 134/101 -- 86 17 98 %   01/12/23 2130 -- -- 85 17 99 %       HEENT Exam:     WNL         Lung Exam:     The patient is not dyspneic. Breath sounds are heard equally in all lung fields. There are no wheezes, rales, rhonchi, or rubs heard on auscultation. Heart Exam:     The rhythm is regular. The PMI is in the 5th intercostal space of the MCL. Apical impulse is normal. S1 is regular. S2 is physiologic. There is no S3, S4 gallop, murmur, click, or rub. Abdomen Exam:     Benign. Extremities Exam:     No cyanosis, clubbing, edema. Distal pulses intact.            Lab Results   Component Value Date/Time    Glucose 133 (H) 01/12/2023 12:31 PM    Sodium 137 01/12/2023 12:31 PM    Potassium 3.8 01/12/2023 12:31 PM    Chloride 106 01/12/2023 12:31 PM CO2 25 01/12/2023 12:31 PM    BUN 21 (H) 01/12/2023 12:31 PM    Creatinine 1.02 01/12/2023 12:31 PM    Calcium 8.5 01/12/2023 12:31 PM     Recent Labs     01/12/23  1231   WBC 17.2*   HGB 13.3   HCT 41.1        No results for input(s): ALT, AP, TBIL, TBILI, TP, ALB, GLOB, GGT in the last 72 hours. No lab exists for component: SGOT, GPT  No results for input(s): INR, PTP, APTT, INREXT in the last 72 hours. No results for input(s): CPK, CKMB, TNIPOC in the last 72 hours. No lab exists for component: TROPONINI, ITNL  No results for input(s): TROIQ in the last 72 hours.   No results found for: CHOL, CHOLX, CHLST, CHOLV, HDL, HDLP, LDL, LDLC, DLDLP, TGLX, TRIGL, TRIGP, CHHD, CHHDX

## 2023-01-13 NOTE — PROGRESS NOTES
1400: Bedside and Verbal shift change report given to Saúl RN (oncoming nurse) by Bill Awan RN (offgoing nurse). Report included the following information SBAR, Kardex, Intake/Output, MAR, Recent Results, Cardiac Rhythm NSR, Alarm Parameters , and Quality Measures. 1445: Dr. Sonny Dickerson stated that patient is able to be discharged.

## 2023-01-13 NOTE — DISCHARGE SUMMARY
See progress note. ASA, plavix, xarelto. F/U with Dr. Sapna Auguste 1 week at which time he may stop ASA. CTA done, results pending.

## 2023-01-13 NOTE — PROGRESS NOTES
Problem: Falls - Risk of  Goal: *Absence of Falls  Description: Document Cherylle Cockayne Fall Risk and appropriate interventions in the flowsheet.   Outcome: Resolved/Met  Note: Fall Risk Interventions:                                Problem: Patient Education: Go to Patient Education Activity  Goal: Patient/Family Education  Outcome: Resolved/Met     Problem: Patient Education: Go to Patient Education Activity  Goal: Patient/Family Education  Outcome: Resolved/Met     Problem: AMI: Day 2  Goal: Off Pathway (Use only if patient is Off Pathway)  Outcome: Resolved/Met  Goal: Activity/Safety  Outcome: Resolved/Met  Goal: Consults, if ordered  Outcome: Resolved/Met  Goal: Diagnostic Test/Procedures  Outcome: Resolved/Met  Goal: Nutrition/Diet  Outcome: Resolved/Met  Goal: Discharge Planning  Outcome: Resolved/Met  Goal: Medications  Outcome: Resolved/Met  Goal: Respiratory  Outcome: Resolved/Met  Goal: Treatments/Interventions/Procedures  Outcome: Resolved/Met  Goal: Psychosocial  Outcome: Resolved/Met  Goal: *Optimal pain control at patient's stated goal  Outcome: Resolved/Met  Goal: *Hemodynamically stable  Outcome: Resolved/Met  Goal: *Demonstrates progressive activity  Outcome: Resolved/Met  Goal: *Tolerating diet  Outcome: Resolved/Met     Problem: AMI: Day 3  Goal: Off Pathway (Use only if patient is Off Pathway)  Outcome: Resolved/Met  Goal: Activity/Safety  Outcome: Resolved/Met  Goal: Consults, if ordered  Outcome: Resolved/Met  Goal: Diagnostic Test/Procedures  Outcome: Resolved/Met  Goal: Nutrition/Diet  Outcome: Resolved/Met  Goal: Discharge Planning  Outcome: Resolved/Met  Goal: Medications  Outcome: Resolved/Met  Goal: Respiratory  Outcome: Resolved/Met  Goal: Treatments/Interventions/Procedures  Outcome: Resolved/Met  Goal: Psychosocial  Outcome: Resolved/Met  Goal: *Optimal pain control at patient's stated goal  Outcome: Resolved/Met  Goal: *Hemodynamically stable  Outcome: Resolved/Met  Goal: *Demonstrates progressive activity  Outcome: Resolved/Met  Goal: *Tolerating diet  Outcome: Resolved/Met     Problem: AMI: Day 4  Goal: Off Pathway (Use only if patient is Off Pathway)  Outcome: Resolved/Met  Goal: Activity/Safety  Outcome: Resolved/Met  Goal: Diagnostic Test/Procedures  Outcome: Resolved/Met  Goal: Nutrition/Diet  Outcome: Resolved/Met  Goal: Discharge Planning  Outcome: Resolved/Met  Goal: Medications  Outcome: Resolved/Met  Goal: Respiratory  Outcome: Resolved/Met  Goal: Treatments/Interventions/Procedures  Outcome: Resolved/Met  Goal: Psychosocial  Outcome: Resolved/Met  Goal: *Optimal pain control at patient's stated goal  Outcome: Resolved/Met  Goal: *Hemodynamically stable  Outcome: Resolved/Met  Goal: *Demonstrates progressive activity  Outcome: Resolved/Met  Goal: *Tolerating diet  Outcome: Resolved/Met     Problem: AMI: Day 5  Goal: Off Pathway (Use only if patient is Off Pathway)  Outcome: Resolved/Met  Goal: Activity/Safety  Outcome: Resolved/Met  Goal: Diagnostic Test/Procedures  Outcome: Resolved/Met  Goal: Nutrition/Diet  Outcome: Resolved/Met  Goal: Discharge Planning  Outcome: Resolved/Met  Goal: Medications  Outcome: Resolved/Met  Goal: Treatments/Interventions/Procedures  Outcome: Resolved/Met  Goal: Psychosocial  Outcome: Resolved/Met     Problem: AMI: Discharge Outcomes  Goal: *Demonstrates ability to perform prescribed activity without shortness of breath or discomfort  Outcome: Resolved/Met  Goal: *Verbalizes understanding and describes prescribed diet  Outcome: Resolved/Met  Goal: *Describes follow-up/return visits to physicians  Outcome: Resolved/Met  Goal: *Describes home care/support arrangements established based on need  Outcome: Resolved/Met  Goal: *Anxiety reduced or absent  Outcome: Resolved/Met  Goal: *Understands and describes signs and symptoms to report to providers(Stroke Metric)  Outcome: Resolved/Met  Goal: *Verbalizes name, dosage, time, side effects, and number of days to continue medications  Outcome: Resolved/Met

## 2023-01-13 NOTE — PROGRESS NOTES
0700: Bedside and Verbal shift change report given to Sandra BENITES RN (oncoming nurse) by Sarah De Anda (offgoing nurse). Report included the following information SBAR, Kardex, Intake/Output, MAR, Recent Results, Cardiac Rhythm SR, and Alarm Parameters . Primary Nurse [id number] [facility] [address], RN and Rebecca Nascimento RN performed a dual skin assessment on this patient Impairment noted- see wound doc flow sheet  0800: Shift assessment completed. 0935: Pt to CT.  1020: Pt back from CT.  1200: Reassessment completed. 1330: Dr. Paola Bower at bedside. 1500: Bedside and Verbal shift change report given to [id number] (oncoming nurse) by Yao Clarke RN (offgoing nurse). Report included the following information SBAR, Kardex, Intake/Output, MAR, Recent Results, Cardiac Rhythm SR, and Alarm Parameters .

## 2023-01-14 ENCOUNTER — TELEPHONE (OUTPATIENT)
Dept: CARDIOLOGY CLINIC | Age: 78
End: 2023-01-14

## 2023-01-14 RX ORDER — CLOPIDOGREL BISULFATE 75 MG/1
75 TABLET ORAL DAILY
Qty: 30 TABLET | Refills: 5 | Status: SHIPPED | OUTPATIENT
Start: 2023-01-14

## 2023-01-16 ENCOUNTER — TRANSCRIBE ORDER (OUTPATIENT)
Dept: CARDIAC REHAB | Age: 78
End: 2023-01-16

## 2023-01-16 DIAGNOSIS — I21.3 MYOCARDIAL NECROSIS SYNDROME (HCC): Primary | ICD-10-CM

## 2023-01-18 ENCOUNTER — TELEPHONE (OUTPATIENT)
Dept: CARDIAC REHAB | Age: 78
End: 2023-01-18

## 2023-01-18 NOTE — TELEPHONE ENCOUNTER
[facility] Cardiopulmonary Rehab:  Spoke with patient re: participating in cardiac rehab program. 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 Reviewed benefits of program. Pt declined cardiac rehab and stated, \"I'm doing fine. \" Encouraged pt to discuss cardiac rehab with Dr Oli Peng during 1/20/23 appt.

## (undated) DEVICE — CATH GUID COR JL4.0 6FR 100CM -- LAUNCHER

## (undated) DEVICE — MINI TREK CORONARY DILATATION CATHETER 2.0 MM X 12 MM / RAPID-EXCHANGE: Brand: MINI TREK

## (undated) DEVICE — DEVICE INFL 20ML 30ATM DGT FLD DISPNS SYR W ACCESSPLUS BLU

## (undated) DEVICE — TUBING PRSS MON L6IN PVC M FEM CONN

## (undated) DEVICE — CATH GUID COR JR4.0 6FR 100CM -- LAUNCHER

## (undated) DEVICE — NEEDLE ANGIO 18GA L9CM NRML 1 WALL SMOOTH FINISH CLR HUB FOR

## (undated) DEVICE — PINNACLE INTRODUCER SHEATH: Brand: PINNACLE

## (undated) DEVICE — PRESSURE MONITORING SET: Brand: TRUWAVE

## (undated) DEVICE — MEDI-TRACE CADENCE ADULT, DEFIBRILLATION ELECTRODE -RTS  (10 PR/PK) - PHYSIO-CONTROL: Brand: MEDI-TRACE CADENCE

## (undated) DEVICE — CATH DIAG-D 6F MULTI PIG 155 5 -- IMPULSE 16599-302

## (undated) DEVICE — SUTURE PERMAHAND SZ 2-0 L18IN NONABSORBABLE BLK L26MM PS 1588H

## (undated) DEVICE — GUIDEWIRE VASC L180CM DIA0.035IN 3MM PTFE J TIP EXCHG FIX

## (undated) DEVICE — Device: Brand: OMNIWIRE PRESSURE GUIDE WIRE

## (undated) DEVICE — CATH GUID COR JL4.0S 6FR 100CM -- LAUNCHER

## (undated) DEVICE — Device: Brand: ASAHI SION BLUE

## (undated) DEVICE — ADMINISTRATION SET 72 IN SINGLE LUER LCK NAMIC

## (undated) DEVICE — GLIDESHEATH SLENDER STAINLESS STEEL KIT: Brand: GLIDESHEATH SLENDER

## (undated) DEVICE — FIXED CORE WIRE GUIDE SAFE-T-J, CURVED: Brand: COOK

## (undated) DEVICE — HEART CATH-SFMC: Brand: MEDLINE INDUSTRIES, INC.

## (undated) DEVICE — SYRINGE MED 10ML RED POLYCARB BRL FIX M LUER CONN FLAT GRP

## (undated) DEVICE — TUBING PRSS MON L12IN PVC RIG NONEXPANDING M TO FEM CONN